# Patient Record
Sex: MALE | Race: WHITE | Employment: FULL TIME | URBAN - METROPOLITAN AREA
[De-identification: names, ages, dates, MRNs, and addresses within clinical notes are randomized per-mention and may not be internally consistent; named-entity substitution may affect disease eponyms.]

---

## 2018-03-31 ENCOUNTER — APPOINTMENT (OUTPATIENT)
Dept: GENERAL RADIOLOGY | Age: 38
End: 2018-03-31
Attending: EMERGENCY MEDICINE
Payer: COMMERCIAL

## 2018-03-31 ENCOUNTER — HOSPITAL ENCOUNTER (OUTPATIENT)
Age: 38
Discharge: HOME | End: 2018-03-31
Attending: EMERGENCY MEDICINE
Payer: COMMERCIAL

## 2018-03-31 VITALS
DIASTOLIC BLOOD PRESSURE: 97 MMHG | WEIGHT: 140 LBS | OXYGEN SATURATION: 98 % | RESPIRATION RATE: 16 BRPM | TEMPERATURE: 98 F | HEART RATE: 113 BPM | SYSTOLIC BLOOD PRESSURE: 154 MMHG | BODY MASS INDEX: 20.73 KG/M2 | HEIGHT: 69 IN

## 2018-03-31 DIAGNOSIS — S70.02XA CONTUSION OF LEFT HIP REGION: Primary | ICD-10-CM

## 2018-03-31 PROCEDURE — 73502 X-RAY EXAM HIP UNI 2-3 VIEWS: CPT | Performed by: EMERGENCY MEDICINE

## 2018-03-31 PROCEDURE — 99203 OFFICE O/P NEW LOW 30 MIN: CPT

## 2018-03-31 NOTE — ED INITIAL ASSESSMENT (HPI)
PATIENT ARRIVED AMBULATORY TO ROOM VIA WHEELCHAIR C/O LEFT HIP PAIN. PATIENT STATES \"I TRIPPED ON THE SIDEWALK YESTERDAY AND FELL ONTO MY LEFT HIP\" PATIENT STATES \"I CAN BARELY PUT ANY PRESSURE ON MY LEG\" PATIENT DENIES NUMBNESS/TINGLING TO FOOT.  MIA

## 2018-03-31 NOTE — ED PROVIDER NOTES
Patient Seen in: [address] [facility]    History   Patient presents with:  Hip Pain    Stated Complaint: Hip injury     HPI  Patient is here complaining of left hip pain.   Patient has history of psoriatic arthritis and is currently Constitutional: He is oriented to person, place, and time. He appears well-developed and well-nourished. No distress. Well appearing   HENT:   Head: Normocephalic and atraumatic.    Right Ear: External ear normal.   Left Ear: External ear normal.   Eyes: ------------------------------------------------------------       MDM   The results of the x-rays were discussed with patient and dad. Patient arrived with crutches they bought from [place].   The fit was confirmed and crutch walking instruction were gi